# Patient Record
Sex: FEMALE | URBAN - METROPOLITAN AREA
[De-identification: names, ages, dates, MRNs, and addresses within clinical notes are randomized per-mention and may not be internally consistent; named-entity substitution may affect disease eponyms.]

---

## 2019-07-23 ENCOUNTER — IMPORTED ENCOUNTER (OUTPATIENT)
Dept: URBAN - METROPOLITAN AREA CLINIC 31 | Facility: CLINIC | Age: 49
End: 2019-07-23

## 2019-07-23 PROBLEM — H53.469: Noted: 2019-07-23

## 2019-07-23 PROBLEM — M06.9: Noted: 2019-07-23

## 2019-07-23 PROBLEM — M32.9: Noted: 2019-07-23

## 2019-07-23 PROBLEM — Z79.899: Noted: 2019-07-23

## 2019-07-23 PROCEDURE — 92083 EXTENDED VISUAL FIELD XM: CPT

## 2019-07-23 PROCEDURE — 92134 CPTRZ OPH DX IMG PST SGM RTA: CPT

## 2019-07-23 PROCEDURE — 92250 FUNDUS PHOTOGRAPHY W/I&R: CPT

## 2019-07-23 PROCEDURE — 92004 COMPRE OPH EXAM NEW PT 1/>: CPT

## 2019-07-23 NOTE — PATIENT DISCUSSION
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

## 2019-07-23 NOTE — PATIENT DISCUSSION
1.  Plaquenil - Pt currently on oral plaquenil therapy. No signs of retinopathy based on todays dilated exam and OCT. Continue to follow with prescribing medical professional.2. Plaquenil- Pt currently on oral plaquenil therapy. No signs of retinopathy based on todays dilated exam and Visual Field. Continue to follow with prescribing medical professional.3. Lupus - Continue to follow with PCP/Rheumatologist/Neurologist.4. Rheumatoid Arthritis - Continue to follow with PCP/Rheumatologist/Neurologist.5. 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 6. Refractive error- Single vision reading. 7.  Return for an appointment in 6 months for office call. VF 24-2. with Dr. Yuni Eckert.

## 2020-01-13 ENCOUNTER — IMPORTED ENCOUNTER (OUTPATIENT)
Dept: URBAN - METROPOLITAN AREA CLINIC 31 | Facility: CLINIC | Age: 50
End: 2020-01-13

## 2020-01-13 PROBLEM — M06.9: Noted: 2020-01-13

## 2020-01-13 PROBLEM — H53.469: Noted: 2020-01-13

## 2020-01-13 PROBLEM — M32.9: Noted: 2020-01-13

## 2020-01-13 PROBLEM — Z79.899: Noted: 2020-01-13

## 2020-01-13 PROCEDURE — 92083 EXTENDED VISUAL FIELD XM: CPT

## 2020-01-13 PROCEDURE — 99213 OFFICE O/P EST LOW 20 MIN: CPT

## 2020-01-13 NOTE — PATIENT DISCUSSION
1.  Plaquenil - Pt currently on oral plaquenil therapy. No signs of retinopathy today. 2. Lupus - Continue to follow with PCP/Rheumatologist/Neurologist.3. Rheumatoid Arthritis - Continue to follow with PCP/Rheumatologist/Neurologist.4. 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 Stable on today's 24-2 VF. 5. Return for an appointment in 12 month for complete exam and VF 24-2. with Dr. Yuliet Guadalupe.

## 2020-10-27 ENCOUNTER — OFFICE VISIT (OUTPATIENT)
Dept: URBAN - METROPOLITAN AREA CLINIC 121 | Facility: CLINIC | Age: 50
End: 2020-10-27

## 2021-01-18 ENCOUNTER — IMPORTED ENCOUNTER (OUTPATIENT)
Dept: URBAN - METROPOLITAN AREA CLINIC 31 | Facility: CLINIC | Age: 51
End: 2021-01-18

## 2021-01-18 PROBLEM — M32.9: Noted: 2021-01-18

## 2021-01-18 PROBLEM — Z79.899: Noted: 2021-01-18

## 2021-01-18 PROBLEM — H53.469: Noted: 2021-01-18

## 2021-01-18 PROBLEM — M06.9: Noted: 2021-01-18

## 2021-01-18 PROCEDURE — 92015 DETERMINE REFRACTIVE STATE: CPT

## 2021-01-18 PROCEDURE — 92083 EXTENDED VISUAL FIELD XM: CPT

## 2021-01-18 PROCEDURE — 92134 CPTRZ OPH DX IMG PST SGM RTA: CPT

## 2021-01-18 PROCEDURE — 92014 COMPRE OPH EXAM EST PT 1/>: CPT

## 2021-01-18 PROCEDURE — 92250 FUNDUS PHOTOGRAPHY W/I&R: CPT

## 2021-01-18 NOTE — PATIENT DISCUSSION
1.  Plaquenil - Pt currently on oral plaquenil therapy. No signs of retinopathy today. 2. Lupus - Continue to follow with PCP/Rheumatologist/Neurologist.3. Rheumatoid Arthritis - Continue to follow with PCP/Rheumatologist/Neurologist.4. 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 Stable on today's 24-2 VF. 5. Return for an appointment in 12 month for complete exam macula OCT and VF 24-2. with Dr. Paris Roberts.

## 2021-06-11 ENCOUNTER — IMPORTED ENCOUNTER (OUTPATIENT)
Dept: URBAN - METROPOLITAN AREA CLINIC 31 | Facility: CLINIC | Age: 51
End: 2021-06-11

## 2021-06-11 PROCEDURE — 92250 FUNDUS PHOTOGRAPHY W/I&R: CPT

## 2021-06-11 PROCEDURE — 99214 OFFICE O/P EST MOD 30 MIN: CPT

## 2021-06-18 ENCOUNTER — OFFICE VISIT (OUTPATIENT)
Dept: URBAN - METROPOLITAN AREA CLINIC 63 | Facility: CLINIC | Age: 51
End: 2021-06-18

## 2021-07-22 ENCOUNTER — OFFICE VISIT (OUTPATIENT)
Dept: URBAN - METROPOLITAN AREA SURGERY CENTER 4 | Facility: SURGERY CENTER | Age: 51
End: 2021-07-22

## 2021-07-27 LAB — PATHOLOGY (INDENTED REPORT): (no result)

## 2021-08-05 ENCOUNTER — OFFICE VISIT (OUTPATIENT)
Dept: URBAN - METROPOLITAN AREA CLINIC 63 | Facility: CLINIC | Age: 51
End: 2021-08-05

## 2021-08-09 ENCOUNTER — OFFICE VISIT (OUTPATIENT)
Dept: URBAN - METROPOLITAN AREA CLINIC 63 | Facility: CLINIC | Age: 51
End: 2021-08-09

## 2021-08-10 ENCOUNTER — OFFICE VISIT (OUTPATIENT)
Dept: URBAN - METROPOLITAN AREA TELEHEALTH 2 | Facility: TELEHEALTH | Age: 51
End: 2021-08-10

## 2022-02-28 ENCOUNTER — IMPORTED ENCOUNTER (OUTPATIENT)
Dept: URBAN - METROPOLITAN AREA CLINIC 31 | Facility: CLINIC | Age: 52
End: 2022-02-28

## 2022-02-28 PROBLEM — M32.9: Noted: 2022-02-28

## 2022-02-28 PROBLEM — M06.9: Noted: 2022-02-28

## 2022-02-28 PROBLEM — Z79.899: Noted: 2022-02-28

## 2022-02-28 PROBLEM — H53.469: Noted: 2022-02-28

## 2022-02-28 PROCEDURE — 99214 OFFICE O/P EST MOD 30 MIN: CPT

## 2022-02-28 PROCEDURE — 92134 CPTRZ OPH DX IMG PST SGM RTA: CPT

## 2022-02-28 PROCEDURE — 92083 EXTENDED VISUAL FIELD XM: CPT

## 2022-02-28 PROCEDURE — 92015 DETERMINE REFRACTIVE STATE: CPT

## 2022-02-28 NOTE — PATIENT DISCUSSION
1.  Plaquenil - Pt currently on oral plaquenil therapy. No signs of retinopathy today. 2. Lupus - Continue to follow with PCP/Rheumatologist/Neurologist.3. Rheumatoid Arthritis - Continue to follow with PCP/Rheumatologist/Neurologist.4. 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 Stable on today's 24-2 VF. 5. Refractive error - Glasses change optional.6. Return for an appointment in 12 month for complete exam macula OCT and VF 10-2 with Dr. Krystin Mata.

## 2022-04-02 ASSESSMENT — TONOMETRY
OD_IOP_MMHG: 17
OS_IOP_MMHG: 16
OS_IOP_MMHG: 17
OD_IOP_MMHG: 16
OS_IOP_MMHG: 17
OD_IOP_MMHG: 17

## 2022-04-02 ASSESSMENT — VISUAL ACUITY
OD_SC: 20/25
OS_SC: 20/25
OD_CC: 20/30-2
OD_CC: 20/25-3
OS_CC: 20/20-1
OS_CC: 20/25-2
OD_SC: 20/50
OS_CC: 20/25-1
OS_SC: 20/50
OS_SC: 20/25-2
OD_CC: 20/20
OS_CC: 20/20-2
OD_SC: 20/25
OD_CC: 20/40+2

## 2022-07-09 ENCOUNTER — TELEPHONE ENCOUNTER (OUTPATIENT)
Dept: URBAN - METROPOLITAN AREA CLINIC 121 | Facility: CLINIC | Age: 52
End: 2022-07-09

## 2022-07-09 RX ORDER — AA/PROT/LYSINE/METHIO/VIT C/B6 50-12.5 MG
TABLET ORAL ONCE A DAY
Refills: 0 | OUTPATIENT
Start: 2012-06-05 | End: 2021-06-18

## 2022-07-09 RX ORDER — ESOMEPRAZOLE MAGNESIUM 40 MG
CAPSULE,DELAYED RELEASE (ENTERIC COATED) ORAL TWICE A DAY
Refills: 0 | OUTPATIENT
Start: 2005-12-13 | End: 2006-01-10

## 2022-07-09 RX ORDER — ALBUTEROL SULFATE 0.63 MG/3ML
SOLUTION RESPIRATORY (INHALATION) TAKE AS DIRECTED
Refills: 0 | OUTPATIENT
Start: 2012-06-05 | End: 2021-06-18

## 2022-07-09 RX ORDER — FLUCONAZOLE 100 MG/1
TABLET ORAL TAKE AS DIRECTED
Refills: 0 | OUTPATIENT
Start: 2012-06-05 | End: 2021-06-18

## 2022-07-09 RX ORDER — DICYCLOMINE HYDROCHLORIDE 20 MG/1
TABLET ORAL
Refills: 0 | OUTPATIENT
Start: 2021-05-20 | End: 2021-06-18

## 2022-07-09 RX ORDER — SUCRALFATE 1 G/10ML
AC/HS 10 ML=1 GM SUSPENSION ORAL
Refills: 3 | OUTPATIENT
Start: 2005-12-13 | End: 2006-01-10

## 2022-07-09 RX ORDER — TAMSULOSIN HCL 0.4 MG
CAPSULE ORAL ONCE A DAY
Refills: 0 | OUTPATIENT
Start: 2012-06-05 | End: 2021-06-18

## 2022-07-09 RX ORDER — HYDROXYCHLOROQUINE SULFATE 200 MG/1
TABLET, FILM COATED ORAL
Refills: 0 | OUTPATIENT
Start: 2021-05-25 | End: 2021-06-18

## 2022-07-09 RX ORDER — FLUTICASONE PROPIONATE 50 UG/1
SPRAY, METERED NASAL
Refills: 0 | OUTPATIENT
Start: 2011-03-09 | End: 2012-06-05

## 2022-07-09 RX ORDER — NADOLOL 20 MG/1
TABLET ORAL
Refills: 0 | OUTPATIENT
Start: 2011-03-09 | End: 2013-06-05

## 2022-07-09 RX ORDER — HYDROXYCHLOROQUINE SULFATE 200 MG/1
TABLET, FILM COATED ORAL
Refills: 0 | OUTPATIENT
Start: 2021-06-13 | End: 2021-06-18

## 2022-07-09 RX ORDER — OMEPRAZOLE 40 MG/1
CAPSULE, DELAYED RELEASE ORAL
Refills: 0 | OUTPATIENT
Start: 2021-06-04 | End: 2021-06-18

## 2022-07-09 RX ORDER — ERGOCALCIFEROL 1.25 MG/1
CAPSULE ORAL
Refills: 0 | OUTPATIENT
Start: 2021-05-13 | End: 2021-06-18

## 2022-07-09 RX ORDER — ESOMEPRAZOLE MAGNESIUM 40 MG
CAPSULE,DELAYED RELEASE (ENTERIC COATED) ORAL TWICE A DAY
Refills: 6 | OUTPATIENT
Start: 2006-01-10 | End: 2006-08-15

## 2022-07-09 RX ORDER — MYCOPHENOLATE MOFETIL 250 MG/1
CAPSULE ORAL
Refills: 0 | OUTPATIENT
Start: 2021-06-10 | End: 2021-06-18

## 2022-07-09 RX ORDER — TAMSULOSIN HYDROCHLORIDE 0.4 MG/1
CAPSULE ORAL
Refills: 0 | OUTPATIENT
Start: 2011-03-09 | End: 2013-06-05

## 2022-07-09 RX ORDER — ALPRAZOLAM 0.25 MG/1
TABLET ORAL
Refills: 0 | OUTPATIENT
Start: 2021-06-07 | End: 2021-06-18

## 2022-07-09 RX ORDER — MONTELUKAST SODIUM 5 MG/1
TABLET, CHEWABLE ORAL
Refills: 0 | OUTPATIENT
Start: 2011-03-09 | End: 2012-06-05

## 2022-07-09 RX ORDER — AMOXICILLIN 500 MG/1
CAPSULE ORAL TAKE AS DIRECTED
Refills: 0 | OUTPATIENT
Start: 2012-06-05 | End: 2021-06-18

## 2022-07-09 RX ORDER — GABAPENTIN 300 MG/1
CAPSULE ORAL
Refills: 0 | OUTPATIENT
Start: 2021-03-22 | End: 2021-06-18

## 2022-07-09 RX ORDER — CHROMIUM 200 MCG
TABLET ORAL
Refills: 0 | OUTPATIENT
Start: 2011-03-09 | End: 2012-06-05

## 2022-07-09 RX ORDER — ALPRAZOLAM 0.25 MG/1
TABLET ORAL
Refills: 0 | OUTPATIENT
Start: 2011-03-09 | End: 2021-06-18

## 2022-07-09 RX ORDER — CALCIUM NO.38/D3/MAG/BORON ASP 500MG/15ML
LIQUID (ML) ORAL
Refills: 0 | OUTPATIENT
Start: 2011-03-09 | End: 2012-06-05

## 2022-07-09 RX ORDER — AZATHIOPRINE 50 MG/1
TABLET ORAL
Refills: 0 | OUTPATIENT
Start: 2011-03-09 | End: 2021-06-18

## 2022-07-09 RX ORDER — AMOXICILLIN 500 MG/1
TABLET, FILM COATED ORAL
Refills: 0 | OUTPATIENT
Start: 2011-03-09 | End: 2012-06-05

## 2022-07-09 RX ORDER — PAROXETINE HYDROCHLORIDE 10 MG/1
TABLET ORAL
Refills: 0 | OUTPATIENT
Start: 2021-05-09 | End: 2021-06-18

## 2022-07-09 RX ORDER — ROPINIROLE 0.25 MG/1
TABLET, FILM COATED ORAL
Refills: 0 | OUTPATIENT
Start: 2011-03-09 | End: 2021-06-18

## 2022-07-09 RX ORDER — BUSPIRONE HYDROCHLORIDE 15 MG/1
TABLET ORAL
Refills: 0 | OUTPATIENT
Start: 2011-03-09 | End: 2021-06-18

## 2022-07-09 RX ORDER — DEXLANSOPRAZOLE 60 MG/1
CAPSULE, DELAYED RELEASE ORAL ONCE A DAY
Refills: 0 | OUTPATIENT
Start: 2012-06-05 | End: 2013-09-04

## 2022-07-09 RX ORDER — PREDNISONE 5 MG/1
TABLET ORAL THREE TIMES A DAY
Refills: 0 | OUTPATIENT
Start: 2012-06-05 | End: 2021-06-18

## 2022-07-09 RX ORDER — MEDROXYPROGESTERONE ACETATE 150 MG/ML
INJECTION, SUSPENSION INTRAMUSCULAR
Refills: 0 | OUTPATIENT
Start: 2011-03-09 | End: 2012-06-05

## 2022-07-09 RX ORDER — LACOSAMIDE 100 MG/1
TABLET, FILM COATED ORAL
Refills: 0 | OUTPATIENT
Start: 2011-03-09 | End: 2012-06-05

## 2022-07-10 ENCOUNTER — TELEPHONE ENCOUNTER (OUTPATIENT)
Dept: URBAN - METROPOLITAN AREA CLINIC 121 | Facility: CLINIC | Age: 52
End: 2022-07-10

## 2022-07-10 RX ORDER — HYDROXYCHLOROQUINE SULFATE 200 MG/1
TABLET, FILM COATED ORAL ONCE A DAY
Refills: 0 | Status: ACTIVE | COMMUNITY
Start: 2021-06-18

## 2022-07-10 RX ORDER — DICYCLOMINE HYDROCHLORIDE 20 MG/1
TABLET ORAL ONCE A DAY
Refills: 0 | Status: ACTIVE | COMMUNITY
Start: 2021-06-18

## 2022-07-10 RX ORDER — MESALAMINE 500 MG/1
CAPSULE ORAL
Refills: 11 | Status: ACTIVE | COMMUNITY
Start: 2012-09-14

## 2022-07-10 RX ORDER — LEVETIRACETAM 500 MG/1
TABLET, FILM COATED ORAL
Refills: 0 | Status: ACTIVE | COMMUNITY
Start: 2021-06-18

## 2022-07-10 RX ORDER — OMEPRAZOLE 20 MG/1
CAPSULE, DELAYED RELEASE ORAL TWICE A DAY
Refills: 3 | Status: ACTIVE | COMMUNITY
Start: 2011-03-09

## 2022-07-10 RX ORDER — SUCRALFATE 1 G/10ML
AC/HS 10 ML=1 GM SUSPENSION ORAL
Refills: 6 | Status: ACTIVE | COMMUNITY
Start: 2006-01-10

## 2022-07-10 RX ORDER — ERGOCALCIFEROL 1.25 MG/1
CAPSULE ORAL ONCE A DAY
Refills: 0 | Status: ACTIVE | COMMUNITY
Start: 2021-06-18

## 2022-07-10 RX ORDER — MYCOPHENOLATE MOFETIL 500 MG/1
TABLET, FILM COATED ORAL
Refills: 0 | Status: ACTIVE | COMMUNITY
Start: 2021-06-18

## 2022-07-10 RX ORDER — GABAPENTIN 300 MG/1
CAPSULE ORAL THREE TIMES A DAY
Refills: 0 | Status: ACTIVE | COMMUNITY
Start: 2021-06-18

## 2022-07-10 RX ORDER — PAROXETINE HYDROCHLORIDE 10 MG/1
1/2 TAB TABLET ORAL
Refills: 0 | Status: ACTIVE | COMMUNITY
Start: 2021-06-18

## 2022-07-10 RX ORDER — DEXLANSOPRAZOLE 60 MG/1
CAPSULE, DELAYED RELEASE ORAL TWICE A DAY
Refills: 3 | Status: ACTIVE | COMMUNITY
Start: 2013-01-18

## 2022-07-10 RX ORDER — ALPRAZOLAM 0.25 MG/1
TABLET ORAL
Refills: 0 | Status: ACTIVE | COMMUNITY
Start: 2021-06-18

## 2022-07-10 RX ORDER — OMEPRAZOLE 40 MG/1
CAPSULE, DELAYED RELEASE ORAL ONCE A DAY
Refills: 0 | Status: ACTIVE | COMMUNITY
Start: 2021-06-18

## 2022-07-10 RX ORDER — ESOMEPRAZOLE MAGNESIUM 40 MG
CAPSULE,DELAYED RELEASE (ENTERIC COATED) ORAL TWICE A DAY
Refills: 3 | Status: ACTIVE | COMMUNITY
Start: 2006-08-15

## 2023-06-28 ENCOUNTER — COMPREHENSIVE EXAM (OUTPATIENT)
Dept: URBAN - METROPOLITAN AREA CLINIC 29 | Facility: CLINIC | Age: 53
End: 2023-06-28

## 2023-06-28 DIAGNOSIS — H52.203: ICD-10-CM

## 2023-06-28 DIAGNOSIS — H52.13: ICD-10-CM

## 2023-06-28 DIAGNOSIS — H43.813: ICD-10-CM

## 2023-06-28 DIAGNOSIS — Z79.899: ICD-10-CM

## 2023-06-28 DIAGNOSIS — H52.4: ICD-10-CM

## 2023-06-28 DIAGNOSIS — H53.469: ICD-10-CM

## 2023-06-28 PROCEDURE — 92014 COMPRE OPH EXAM EST PT 1/>: CPT

## 2023-06-28 PROCEDURE — 92134 CPTRZ OPH DX IMG PST SGM RTA: CPT

## 2023-06-28 PROCEDURE — 92083 EXTENDED VISUAL FIELD XM: CPT

## 2023-06-28 PROCEDURE — 92015 DETERMINE REFRACTIVE STATE: CPT

## 2023-06-28 ASSESSMENT — VISUAL ACUITY
OD_SC: 20/20
OS_SC: 20/20
OS_CC: 20/20-1
OD_CC: 20/20

## 2024-01-24 ENCOUNTER — TELEPHONE ENCOUNTER (OUTPATIENT)
Dept: URBAN - METROPOLITAN AREA CLINIC 63 | Facility: CLINIC | Age: 54
End: 2024-01-24

## 2024-03-06 ENCOUNTER — OV EP (OUTPATIENT)
Dept: URBAN - METROPOLITAN AREA CLINIC 63 | Facility: CLINIC | Age: 54
End: 2024-03-06
Payer: COMMERCIAL

## 2024-03-06 ENCOUNTER — LAB (OUTPATIENT)
Dept: URBAN - METROPOLITAN AREA CLINIC 63 | Facility: CLINIC | Age: 54
End: 2024-03-06

## 2024-03-06 VITALS
BODY MASS INDEX: 18.96 KG/M2 | TEMPERATURE: 97.7 F | DIASTOLIC BLOOD PRESSURE: 70 MMHG | HEIGHT: 63 IN | WEIGHT: 107 LBS | OXYGEN SATURATION: 97 % | HEART RATE: 70 BPM | SYSTOLIC BLOOD PRESSURE: 100 MMHG

## 2024-03-06 DIAGNOSIS — K29.00 ACUTE GASTRITIS WITHOUT HEMORRHAGE, UNSPECIFIED GASTRITIS TYPE: ICD-10-CM

## 2024-03-06 PROCEDURE — 99214 OFFICE O/P EST MOD 30 MIN: CPT | Performed by: NURSE PRACTITIONER

## 2024-03-06 RX ORDER — BENZONATATE 200 MG/1
TAKE 1 CAPSULE BY MOUTH THREE TIMES A DAY FOR 30 DAYS CAPSULE ORAL
Qty: 90 EACH | Refills: 0 | Status: ACTIVE | COMMUNITY

## 2024-03-06 RX ORDER — HYDROXYCHLOROQUINE SULFATE 200 MG/1
TABLET, FILM COATED ORAL
Qty: 90 TABLET | Status: ACTIVE | COMMUNITY

## 2024-03-06 RX ORDER — ESOMEPRAZOLE MAGNESIUM 40 MG/1
1 CAPSULE CAPSULE, DELAYED RELEASE ORAL TWICE A DAY
Qty: 180 CAPSULE | Refills: 3 | OUTPATIENT
Start: 2024-03-06

## 2024-03-06 RX ORDER — SUCRALFATE 1 G/1
TAKE 1 TABLET BY MOUTH TWICE A DAY ON EMPTY STOMACH TABLET ORAL
Qty: 180 EACH | Refills: 0 | Status: ACTIVE | COMMUNITY

## 2024-03-06 RX ORDER — GABAPENTIN 300 MG/1
3 CAPSULES ORALLY ONCE A DAY, AT BEDTIME 90 DAYS CAPSULE ORAL
Qty: 270 EACH | Refills: 0 | Status: ACTIVE | COMMUNITY

## 2024-03-06 RX ORDER — PAROXETINE 10 MG/1
TABLET, FILM COATED ORAL
Qty: 45 TABLET | Status: ACTIVE | COMMUNITY

## 2024-03-06 RX ORDER — SUCRALFATE 1 G/1
TABLET ORAL
Qty: 180 TABLET | Status: ACTIVE | COMMUNITY

## 2024-03-06 RX ORDER — MYCOPHENOLATE MOFETIL 250 MG/1
CAPSULE ORAL
Qty: 180 CAPSULE | Status: ACTIVE | COMMUNITY

## 2024-03-06 RX ORDER — OMEPRAZOLE 40 MG/1
1 CAPSULE CAPSULE, DELAYED RELEASE ORAL TWICE A DAY
Qty: 180 CAPSULE | Status: ACTIVE | COMMUNITY

## 2024-03-06 RX ORDER — DICYCLOMINE HYDROCHLORIDE 20 MG/1
TAKE 1 TABLET BY MOUTH TWICE A DAY AS DIRECTED TABLET ORAL
Qty: 180 EACH | Refills: 0 | Status: ACTIVE | COMMUNITY

## 2024-03-06 RX ORDER — LEVETIRACETAM 500 MG/1
TABLET, FILM COATED ORAL
Qty: 135 TABLET | Status: ACTIVE | COMMUNITY

## 2024-03-06 NOTE — HPI-PREVIOUS LABS
Lab work dated 21 January 2024 demonstrates the following abnormalities: WBC 2.5, absolute neutrophils 1.3, absolute lymphocytes 0.9, CO2 31, anion gap 3, AST 37. All remaining lab values of CBC, CMP, serum hCG, troponin and urinalysis are negative or within normal limits.

## 2024-03-06 NOTE — HPI-PREVIOUS IMAGING
CT abdomen and pelvis with contrast/21 January 2024. Gastric wall edema appears similar to prior study: recurrent gastritis is likely. ********** RUQ ultrasound/21 January 2024. Unremarkable right upper quadrant ultrasound.

## 2024-03-06 NOTE — HPI-TODAY'S VISIT:
Thank you very much for kindly referring Leny Cardona, a very pleasant 53-year-old female, back to our service and ER follow-up secondary to gastritis.  Past medical history significant for hyperlipidemia, SLE, epilepsy, asthma, IBS, anxiety, hiatal hernia, RLS, pyelonephritis, Crohn's disease and colon polyps.  Past surgical history significant for Mohs x 3 and craniotomy.  She reports a sorrel history of colon cancer and her last complete colonoscopy was 22 July 2021 that was significant for multiple, advanced adenomatous polyps, but no evidence of active Crohn's disease.  Her Crohn's is currently untreated and has been for the past 3 years.  Leny presents today complaining of epigastric discomfort that has significantly improved since ER visit dated 21 January 2024 (see ED provider note below).  She has been using omeprazole, 40 mg, twice daily and was added sucralfate which has provided benefit.  She reports no change to her bowel habits and also was started on a FODMAP diet, with reportedly good benefit.  She denies pyrosis, dysphagia, change in bowel habits, rectal bleeding, melena or unintentional weight loss.  **********  As per [facility] ED provider note dated 21 January 2024 (Wilman Marie MD): "The patient is a 53 y.o. female who presents to the emergency department via Walk-in with a chief complaint of Abdominal Pain.  53-year-old female with past medical history significant for lupus, hyperlipidemia, possible Crohn's disease presenting with a chief complaint of abdominal pain.  Ongoing for the last Monday almost a week.  Mainly epigastric.  Sometimes radiates into her back.  She describes it as a burning.  No obvious provoking relieving factors.  Has been nauseous no vomiting.  No chest pain.  No black or bloody stools.  No urinary symptoms.  No fevers.  Denies previous abdominal surgeries. Management/Reassessment/MDM: 53-year-old female presenting with abdominal pain.  Doubt atypical cardiac, labs largely noncontributory slight leukopenia.  CT abdomen pelvis showing suggestion of gastritis which I think would be in keeping her presentation.  She already takes omeprazole 40 twice daily.  She has GI.  Advise contact her GI have also provided an internal referral.  I will add Carafate.  Looks well.  Given return precautions.  Advised avoid irritants.Impression/Diagnosis(es): The primary encounter diagnosis was Gastritis, presence of bleeding unspecified, unspecified chronicity, unspecified gastritis type. A diagnosis of Abdominal pain, unspecified abdominal location was also pertinent to this visit."

## 2024-03-06 NOTE — PHYSICAL EXAM CONSTITUTIONAL:
well developed, well nourished , in no acute distress , ambulating without difficulty , normal communication ability Home

## 2024-05-29 ENCOUNTER — WEB ENCOUNTER (OUTPATIENT)
Dept: URBAN - METROPOLITAN AREA CLINIC 63 | Facility: CLINIC | Age: 54
End: 2024-05-29

## 2024-06-04 ENCOUNTER — WEB ENCOUNTER (OUTPATIENT)
Dept: URBAN - METROPOLITAN AREA CLINIC 63 | Facility: CLINIC | Age: 54
End: 2024-06-04

## 2024-06-18 ENCOUNTER — TELEPHONE ENCOUNTER (OUTPATIENT)
Dept: URBAN - METROPOLITAN AREA CLINIC 63 | Facility: CLINIC | Age: 54
End: 2024-06-18

## 2024-06-19 ENCOUNTER — TELEPHONE ENCOUNTER (OUTPATIENT)
Dept: URBAN - METROPOLITAN AREA CLINIC 63 | Facility: CLINIC | Age: 54
End: 2024-06-19

## 2024-06-19 PROBLEM — 428283002: Status: ACTIVE | Noted: 2024-06-19

## 2024-06-26 ENCOUNTER — LAB OUTSIDE AN ENCOUNTER (OUTPATIENT)
Dept: URBAN - METROPOLITAN AREA CLINIC 63 | Facility: CLINIC | Age: 54
End: 2024-06-26

## 2024-06-26 ENCOUNTER — WEB ENCOUNTER (OUTPATIENT)
Dept: URBAN - METROPOLITAN AREA CLINIC 63 | Facility: CLINIC | Age: 54
End: 2024-06-26

## 2024-07-01 ENCOUNTER — ESTABLISHED PATIENT (OUTPATIENT)
Dept: URBAN - METROPOLITAN AREA CLINIC 29 | Facility: CLINIC | Age: 54
End: 2024-07-01

## 2024-07-01 DIAGNOSIS — M06.9: ICD-10-CM

## 2024-07-01 DIAGNOSIS — H43.813: ICD-10-CM

## 2024-07-01 DIAGNOSIS — Z79.899: ICD-10-CM

## 2024-07-01 DIAGNOSIS — H53.469: ICD-10-CM

## 2024-07-01 DIAGNOSIS — M32.9: ICD-10-CM

## 2024-07-01 PROCEDURE — 92083 EXTENDED VISUAL FIELD XM: CPT

## 2024-07-01 PROCEDURE — 99214 OFFICE O/P EST MOD 30 MIN: CPT

## 2024-07-01 PROCEDURE — 92134 CPTRZ OPH DX IMG PST SGM RTA: CPT

## 2024-07-01 PROCEDURE — 92133 CPTRZD OPH DX IMG PST SGM ON: CPT

## 2024-07-01 ASSESSMENT — VISUAL ACUITY
OS_CC: 20/20
OS_CC: 20/20-2
OD_CC: 20/20
OD_SC: 20/20-2
OS_SC: 20/20-1
OD_CC: 20/20

## 2024-07-01 ASSESSMENT — TONOMETRY
OD_IOP_MMHG: 15
OS_IOP_MMHG: 15

## 2024-07-06 ENCOUNTER — WEB ENCOUNTER (OUTPATIENT)
Dept: URBAN - METROPOLITAN AREA CLINIC 63 | Facility: CLINIC | Age: 54
End: 2024-07-06

## 2024-07-23 ENCOUNTER — WEB ENCOUNTER (OUTPATIENT)
Dept: URBAN - METROPOLITAN AREA CLINIC 63 | Facility: CLINIC | Age: 54
End: 2024-07-23

## 2024-07-25 ENCOUNTER — CLAIMS CREATED FROM THE CLAIM WINDOW (OUTPATIENT)
Dept: URBAN - METROPOLITAN AREA CLINIC 4 | Facility: CLINIC | Age: 54
End: 2024-07-25
Payer: COMMERCIAL

## 2024-07-25 ENCOUNTER — OFFICE VISIT (OUTPATIENT)
Dept: URBAN - METROPOLITAN AREA SURGERY CENTER 4 | Facility: SURGERY CENTER | Age: 54
End: 2024-07-25
Payer: COMMERCIAL

## 2024-07-25 DIAGNOSIS — K52.3 INDETERMINATE COLITIS: ICD-10-CM

## 2024-07-25 DIAGNOSIS — K31.89 OTHER DISEASES OF STOMACH AND DUODENUM: ICD-10-CM

## 2024-07-25 DIAGNOSIS — K21.9 GASTRO-ESOPHAGEAL REFLUX DISEASE WITHOUT ESOPHAGITIS: ICD-10-CM

## 2024-07-25 DIAGNOSIS — K44.9 DIAPHRAGMATIC HERNIA WITHOUT OBSTRUCTION OR GANGRENE: ICD-10-CM

## 2024-07-25 DIAGNOSIS — K29.70 GASTRITIS WITHOUT BLEEDING, UNSPECIFIED CHRONICITY, UNSPECIFIED GASTRITIS TYPE: ICD-10-CM

## 2024-07-25 DIAGNOSIS — K31.7 GASTRIC POLYP: ICD-10-CM

## 2024-07-25 DIAGNOSIS — K64.1 SECOND DEGREE HEMORRHOIDS: ICD-10-CM

## 2024-07-25 DIAGNOSIS — K31.7 POLYP OF STOMACH AND DUODENUM: ICD-10-CM

## 2024-07-25 DIAGNOSIS — K29.70 GASTRITIS, UNSPECIFIED, WITHOUT BLEEDING: ICD-10-CM

## 2024-07-25 DIAGNOSIS — K31.7 GASTRIC POLYPS: ICD-10-CM

## 2024-07-25 DIAGNOSIS — K63.89 BOWEL DISEASE, INFLAMMATORY: ICD-10-CM

## 2024-07-25 DIAGNOSIS — K29.70 GASTRITIS: ICD-10-CM

## 2024-07-25 PROCEDURE — 00813 ANES UPR LWR GI NDSC PX: CPT | Performed by: NURSE ANESTHETIST, CERTIFIED REGISTERED

## 2024-07-25 PROCEDURE — 45380 COLONOSCOPY AND BIOPSY: CPT | Performed by: INTERNAL MEDICINE

## 2024-07-25 PROCEDURE — 43239 EGD BIOPSY SINGLE/MULTIPLE: CPT | Performed by: INTERNAL MEDICINE

## 2024-07-25 PROCEDURE — 88312 SPECIAL STAINS GROUP 1: CPT | Performed by: PATHOLOGY

## 2024-07-25 PROCEDURE — 88305 TISSUE EXAM BY PATHOLOGIST: CPT | Performed by: PATHOLOGY

## 2024-07-25 RX ORDER — GABAPENTIN 300 MG/1
3 CAPSULES ORALLY ONCE A DAY, AT BEDTIME 90 DAYS CAPSULE ORAL
Qty: 270 EACH | Refills: 0 | Status: ACTIVE | COMMUNITY

## 2024-07-25 RX ORDER — HYDROXYCHLOROQUINE SULFATE 200 MG/1
TABLET, FILM COATED ORAL
Qty: 90 TABLET | Status: ACTIVE | COMMUNITY

## 2024-07-25 RX ORDER — LEVETIRACETAM 500 MG/1
TABLET, FILM COATED ORAL
Qty: 135 TABLET | Status: ACTIVE | COMMUNITY

## 2024-07-25 RX ORDER — MYCOPHENOLATE MOFETIL 250 MG/1
CAPSULE ORAL
Qty: 180 CAPSULE | Status: ACTIVE | COMMUNITY

## 2024-07-25 RX ORDER — OMEPRAZOLE 40 MG/1
1 CAPSULE CAPSULE, DELAYED RELEASE ORAL TWICE A DAY
Qty: 180 CAPSULE | Status: ACTIVE | COMMUNITY

## 2024-07-25 RX ORDER — BENZONATATE 200 MG/1
TAKE 1 CAPSULE BY MOUTH THREE TIMES A DAY FOR 30 DAYS CAPSULE ORAL
Qty: 90 EACH | Refills: 0 | Status: ACTIVE | COMMUNITY

## 2024-07-25 RX ORDER — SUCRALFATE 1 G/1
TABLET ORAL
Qty: 180 TABLET | Status: ACTIVE | COMMUNITY

## 2024-07-25 RX ORDER — SUCRALFATE 1 G/1
TAKE 1 TABLET BY MOUTH TWICE A DAY ON EMPTY STOMACH TABLET ORAL
Qty: 180 EACH | Refills: 0 | Status: ACTIVE | COMMUNITY

## 2024-07-25 RX ORDER — PAROXETINE 10 MG/1
TABLET, FILM COATED ORAL
Qty: 45 TABLET | Status: ACTIVE | COMMUNITY

## 2024-07-25 RX ORDER — ESOMEPRAZOLE MAGNESIUM 40 MG/1
1 CAPSULE CAPSULE, DELAYED RELEASE ORAL TWICE A DAY
Qty: 180 CAPSULE | Refills: 3 | Status: ACTIVE | COMMUNITY
Start: 2024-03-06

## 2024-07-25 RX ORDER — DICYCLOMINE HYDROCHLORIDE 20 MG/1
TAKE 1 TABLET BY MOUTH TWICE A DAY AS DIRECTED TABLET ORAL
Qty: 180 EACH | Refills: 0 | Status: ACTIVE | COMMUNITY

## 2024-07-30 ENCOUNTER — TELEPHONE ENCOUNTER (OUTPATIENT)
Dept: URBAN - METROPOLITAN AREA CLINIC 64 | Facility: CLINIC | Age: 54
End: 2024-07-30

## 2024-08-01 ENCOUNTER — OFFICE VISIT (OUTPATIENT)
Dept: URBAN - METROPOLITAN AREA CLINIC 63 | Facility: CLINIC | Age: 54
End: 2024-08-01
Payer: COMMERCIAL

## 2024-08-01 ENCOUNTER — OFFICE VISIT (OUTPATIENT)
Dept: URBAN - METROPOLITAN AREA CLINIC 63 | Facility: CLINIC | Age: 54
End: 2024-08-01

## 2024-08-01 ENCOUNTER — DASHBOARD ENCOUNTERS (OUTPATIENT)
Age: 54
End: 2024-08-01

## 2024-08-01 VITALS
HEART RATE: 72 BPM | OXYGEN SATURATION: 98 % | SYSTOLIC BLOOD PRESSURE: 108 MMHG | TEMPERATURE: 98.2 F | BODY MASS INDEX: 20.38 KG/M2 | WEIGHT: 115 LBS | HEIGHT: 63 IN | DIASTOLIC BLOOD PRESSURE: 74 MMHG

## 2024-08-01 DIAGNOSIS — K21.9 GASTROESOPHAGEAL REFLUX DISEASE WITHOUT ESOPHAGITIS: ICD-10-CM

## 2024-08-01 DIAGNOSIS — K50.80 CROHN'S DISEASE OF BOTH SMALL AND LARGE INTESTINE WITHOUT COMPLICATION: ICD-10-CM

## 2024-08-01 PROBLEM — 71833008: Status: ACTIVE | Noted: 2024-08-01

## 2024-08-01 PROBLEM — 266435005: Status: ACTIVE | Noted: 2024-08-01

## 2024-08-01 PROCEDURE — 99214 OFFICE O/P EST MOD 30 MIN: CPT

## 2024-08-01 RX ORDER — DICYCLOMINE HYDROCHLORIDE 20 MG/1
TAKE 1 TABLET BY MOUTH TWICE A DAY AS DIRECTED TABLET ORAL
Qty: 180 EACH | Refills: 0 | Status: ACTIVE | COMMUNITY

## 2024-08-01 RX ORDER — OMEPRAZOLE 40 MG/1
1 CAPSULE CAPSULE, DELAYED RELEASE ORAL TWICE A DAY
Qty: 180 CAPSULE | Status: ACTIVE | COMMUNITY

## 2024-08-01 RX ORDER — PAROXETINE 10 MG/1
TABLET, FILM COATED ORAL
Qty: 45 TABLET | Status: ACTIVE | COMMUNITY

## 2024-08-01 RX ORDER — ESOMEPRAZOLE MAGNESIUM 40 MG/1
1 CAPSULE CAPSULE, DELAYED RELEASE ORAL ONCE A DAY
Qty: 90 CAPSULE | Refills: 3 | OUTPATIENT
Start: 2024-08-01

## 2024-08-01 RX ORDER — FLUTICASONE PROPIONATE 50 UG/1
1 SPRAY IN EACH NOSTRIL SPRAY, METERED NASAL ONCE A DAY
Status: ACTIVE | COMMUNITY

## 2024-08-01 RX ORDER — GABAPENTIN 300 MG/1
3 CAPSULES ORALLY ONCE A DAY, AT BEDTIME 90 DAYS CAPSULE ORAL
Qty: 270 EACH | Refills: 0 | Status: ACTIVE | COMMUNITY

## 2024-08-01 RX ORDER — ESOMEPRAZOLE MAGNESIUM 40 MG/1
1 CAPSULE CAPSULE, DELAYED RELEASE ORAL TWICE A DAY
Qty: 180 CAPSULE | Refills: 3 | Status: ACTIVE | COMMUNITY
Start: 2024-03-06

## 2024-08-01 RX ORDER — LEVETIRACETAM 500 MG/1
TABLET, FILM COATED ORAL
Qty: 135 TABLET | Status: ACTIVE | COMMUNITY

## 2024-08-01 RX ORDER — BENZONATATE 200 MG/1
TAKE 1 CAPSULE BY MOUTH THREE TIMES A DAY FOR 30 DAYS CAPSULE ORAL
Qty: 90 EACH | Refills: 0 | Status: ACTIVE | COMMUNITY

## 2024-08-01 RX ORDER — SUCRALFATE 1 G/1
1 TABLET ON AN EMPTY STOMACH TABLET ORAL TWICE A DAY
Qty: 180 TABLET | Refills: 3

## 2024-08-01 RX ORDER — HYDROXYCHLOROQUINE SULFATE 200 MG/1
TABLET, FILM COATED ORAL
Qty: 90 TABLET | Status: ACTIVE | COMMUNITY

## 2024-08-01 RX ORDER — ALBUTEROL SULFATE 90 UG/1
1 PUFF AS NEEDED AEROSOL, METERED RESPIRATORY (INHALATION)
Status: ACTIVE | COMMUNITY

## 2024-08-01 RX ORDER — MYCOPHENOLATE MOFETIL 250 MG/1
CAPSULE ORAL
Qty: 180 CAPSULE | Status: ACTIVE | COMMUNITY

## 2024-08-01 RX ORDER — SUCRALFATE 1 G/1
TAKE 1 TABLET BY MOUTH TWICE A DAY ON EMPTY STOMACH TABLET ORAL
Qty: 180 EACH | Refills: 0 | Status: ACTIVE | COMMUNITY

## 2024-08-01 RX ORDER — ALPRAZOLAM 0.25 MG/1
1 TABLET TABLET ORAL TWICE A DAY
Status: ACTIVE | COMMUNITY

## 2024-08-01 RX ORDER — SUCRALFATE 1 G/1
TABLET ORAL
Qty: 180 TABLET | Status: ACTIVE | COMMUNITY

## 2024-08-01 NOTE — HPI-PREVIOUS IMAGING
Upper GI series, 4/26/2024 - Thickening of the gastric rugae which can correlate with gastritis - Normal appearance of the esophagus.  No reflux was appreciated. . CT abdomen and pelvis with contrast, 1/21/2024.  -Gastric wall edema appears similar to prior study: recurrent gastritis is likely. . RUQ ultrasound, 1/21/202 -Unremarkable right upper quadrant ultrasound.

## 2024-08-01 NOTE — HPI-TODAY'S VISIT:
This is a very pleasant 53-year-old female who presetns to day with the chief complaint of post o/p follow up.  Past medical history significant for hyperlipidemia, SLE, epilepsy, asthma, IBS, anxiety, hiatal hernia, RLS, pyelonephritis, Crohn's disease and colon polyps.  Past surgical history significant for Mohs x 3 and craniotomy. Family history is significant for sororal history of colon cancer, diagnosed at 55, paternal skin cancer, and paternal aunt with breast cancer. Last colonoscopy, 7/25/2024, 2-year recall Last endoscopy, 7/25/2024 Patient does not follow with a cardiologist.  Patient was last seen in the office on 3/6/2024 with JR Back for gastritis. At that visit, she explained she has been using omeprazole, 40 mg, twice daily and was added sucralfate which has provided benefit.  She reported no change to her bowel habits and also was started on a FODMAP diet, with reportedly good benefit. Her Crohn's is currently untreated and has been for the past 3 years. . Reviewed patient's OP and pathology reports with patient. Colonoscopy recall of 2 years. . She is doing well and reports no symptoms at this time.  She endorses a once to twice daily formed, nonstrenuous bowel movement.  Patient denies blood or mucus in the stool, melena, mouth sores, throat pain, and eye, skin, or joint changes.  She explains that currently her rheumatologist has her on mycophenolate for her other autoimmune conditions, which seems to be working well for her Crohn's. . Patient does have a history of GERD for which she takes esomeprazole, 40 mg, once daily, as well as sucralfate twice a day.  Refill sent to pharmacy. . Patient denies abdominal pain, belching, bloating, constipation, diarrhea, dysphagia, melena, hematochezia, hematemesis, nausea, vomiting, BRBPR, and unintentional weight loss.

## 2024-08-01 NOTE — HPI-HOSPITAL FOLLOWUP
As per [facility] ED provider note dated 21 January 2024 (Wilman Marie MD):  "The patient is a 53 y.o. female who presents to the emergency department via Walk-in with a chief complaint of Abdominal Pain. 53-year-old female with past medical history significant for lupus, hyperlipidemia, possible Crohn's disease presenting with a chief complaint of abdominal pain. Ongoing for the last Monday almost a week. Mainly epigastric. Sometimes radiates into her back. She describes it as a burning. No obvious provoking relieving factors. Has been nauseous no vomiting. No chest pain. No black or bloody stools. No urinary symptoms. No fevers. Denies previous abdominal surgeries.  Management/Reassessment/MDM: 53-year-old female presenting with abdominal pain. Doubt atypical cardiac, labs largely noncontributory slight leukopenia. CT abdomen pelvis showing suggestion of gastritis which I think would be in keeping her presentation. She already takes omeprazole 40 twice daily. She has GI. Advise contact her GI have also provided an internal referral. I will add Carafate. Looks well. Given return precautions. Advised avoid irritants.Impression/Diagnosis(es): The primary encounter diagnosis was Gastritis, presence of bleeding unspecified, unspecified chronicity, unspecified gastritis type. A diagnosis of Abdominal pain, unspecified abdominal location was also pertinent to this visit.

## 2024-08-01 NOTE — HPI-CROHN'S DISEASE
Last CRP, fecal calprotectiin, CBC, and CMP done: never done, ordered today Last colonoscoopy (q2y after 8ys of disease): 7/25/2024 Last endoscopy: 7/25/2024 Personal cancer history: no, but 3x Mohs surgerys for pre-cancerous skin lesions Family history of GI cancer: sororal colon rectal cancer, diagnosed at 55; paternal skin cancer; paternal aunt breast cancer Current IBD med: none   Prior IBD meds (failed): pentasa/mesalamine Last HepB and Quantiferon gold done: none.  Steroid use/response: prednisone (but for lupus)  Last CT/MR enterography (q2y): never had, but she is doing very well, will hold off for now IBD surgical history: none Cardiac history: none.  When diagnosed: [place] 2004

## 2024-08-01 NOTE — HPI-PREVIOUS PROCEDURES
Colonoscopy, 7/22/2021 --Unremarkable terminal ileum.  --11 mm advanced tubular adenomatous polyp removed from cecum.  --Two advanced tubular adenomatous polyps (10-14 mm) were removed from the proximal ascending colon.  --Two tubular adenomatous polyps, one advanced in size (8-10 mm) were removed from the mid transverse colon. --Internal hemorrhoids present.  --Biopsies of the ascending colon and rectum demonstrate colonic mucosa without significant histopathologic diagnosis. --All remaining findings are negative or benign.  --Recommend repeat colonoscopy in 3 years due to advanced tubular adenomatous polyps removed on this procedure and a personal history of Crohn's. . EGD and colonoscopy, 4/27/2011 --2 cm hiatal hernia visible in the esophagus.  --Moderately severe gastritis found in the stomach.  --A few fundal foveal polyps (measuring less than 5 mm in size) were found in the fundus.  --Unremarkable duodenum.  --Unremarkable terminal ileum.  --14 mm advanced tubular adenomatous polyp removed from the ascending colon.  --Evidence of mild proctitis. Internal hemorrhoids present.  --Pathology demonstrates ileal type mucosa with lymphoid hyperplasia, negative for significant acute inflammation in the terminal ileum biopsy and large bowel mucosa with focal mild nonspecific chronic inflammation and scattered benign lymphoid aggregates.  --All remaining findings are negative or benign.  --Recommend repeat colonoscopy in 3 years due to advanced tubular adenomatous polyp removed on this procedure.

## 2024-08-01 NOTE — HPI-PREVIOUS LABS
CMP, 7/29/2024 - Urea nitrogen 6 - Rest within normal limits . CBC, CMP, 1/21/2024  -WBC 2.5 -absolute neutrophils 1.3 -absolute lymphocytes 0.9 -CO2 31 -anion gap 3 -AST 37 -All remaining lab values of CBC, CMP, serum hCG, troponin and urinalysis are negative or within normal limits.

## 2024-08-01 NOTE — HPI-PREVIOUS PROCEDURES
EGD, 7/25/2024, Dr. Person - Normal esophagus.  Biopsied. - 2 cm hiatal hernia. - Gastritis.  Biopsied. - Multiple gastric polyps.  Biopsied. - Normal duodenal bulb and second portion of the duodenum.  Biopsied. . Colonoscopy, 7/25/2024, Dr. Person - Internal hemorrhoids. - Biopsies were obtained in the rectum, in the sigmoid colon, in the descending colon, and in the transverse colon and in the ascending colon. - Repeat colonoscopy in 2 years for surveillance based on pathology results. . EGD and colonoscopy pathology report, 7/25/2024 - Duodenal biopsy; no significant abnormality - Stomach, antrum biopsy; mild chemical/reactive gastropathy.  No evidence of H. pylori organisms or intestinal metaplasia.  Negative for dysplasia or malignancy. - Stomach, fundus biopsy; fundic gland polyp.  No evidence of H. pylori organisms or intestinal metaplasia.  Negative for dysplasia or malignancy - Esophagus, lower third biopsy; squamous mucosa with reflux type changes, no columnar mucosa identified.  No evidence of Lemus's esophagus or eosinophilic esophagitis.  Negative for infectious organisms, dysplasia or malignancy. - Ascending colon biopsy; no significant abnormality - Transverse colon biopsy; no significant abnormality - Descending colon biopsy; no significant abnormality - Sigmoid colon biopsy; no significant abnormality - Rectum biopsy; no significant abnormality . Colonoscopy, 7/22/2021.  -Unremarkable terminal ileum.  -11 mm advanced tubular adenomatous polyp removed from cecum.  -Two advanced tubular adenomatous polyps (10-14 mm) were removed from the proximal ascending colon.  -Two tubular adenomatous polyps, one advanced in size (8-10 mm) were removed from the mid transverse colon. -Internal hemorrhoids present.  -Biopsies of the ascending colon and rectum demonstrate colonic mucosa without significant histopathologic diagnosis.  -All remaining findings are negative or benign.  -Recommend repeat colonoscopy in 3 years due to advanced tubular adenomatous polyps removed on this procedure and a personal history of Crohn's. . EGD-colonoscopy, 4/27/2011.  -2 cm hiatal hernia visible in the esophagus.  -Moderately severe gastritis found in the stomach.  -A few fundal foveal polyps (measuring less than 5 mm in size) were found in the fundus.  -Unremarkable duodenum.  -Unremarkable terminal ileum.  -14 mm advanced tubular adenomatous polyp removed from the ascending colon.  -Evidence of mild proctitis.  -Internal hemorrhoids present.  -Pathology demonstrates ileal type mucosa with lymphoid hyperplasia, negative for significant acute inflammation in the terminal ileum biopsy and large bowel mucosa with focal mild nonspecific chronic inflammation and scattered benign lymphoid aggregates.  -All remaining findings are negative or benign.  -Recommend repeat colonoscopy in 3 years due to advanced tubular adenomatous polyp removed on this procedure.

## 2024-11-05 ENCOUNTER — WEB ENCOUNTER (OUTPATIENT)
Dept: URBAN - METROPOLITAN AREA CLINIC 63 | Facility: CLINIC | Age: 54
End: 2024-11-05

## 2025-01-27 ENCOUNTER — FOLLOW UP (OUTPATIENT)
Age: 55
End: 2025-01-27

## 2025-01-27 DIAGNOSIS — M32.9: ICD-10-CM

## 2025-01-27 DIAGNOSIS — Z79.899: ICD-10-CM

## 2025-01-27 DIAGNOSIS — H43.813: ICD-10-CM

## 2025-01-27 DIAGNOSIS — H53.469: ICD-10-CM

## 2025-01-27 DIAGNOSIS — M06.9: ICD-10-CM

## 2025-01-27 PROCEDURE — 99214 OFFICE O/P EST MOD 30 MIN: CPT

## 2025-02-03 ENCOUNTER — OFFICE VISIT (OUTPATIENT)
Dept: URBAN - METROPOLITAN AREA CLINIC 63 | Facility: CLINIC | Age: 55
End: 2025-02-03
Payer: COMMERCIAL

## 2025-02-03 VITALS
WEIGHT: 116.8 LBS | SYSTOLIC BLOOD PRESSURE: 102 MMHG | TEMPERATURE: 98.2 F | HEIGHT: 63 IN | DIASTOLIC BLOOD PRESSURE: 70 MMHG | BODY MASS INDEX: 20.7 KG/M2 | HEART RATE: 78 BPM | OXYGEN SATURATION: 96 %

## 2025-02-03 DIAGNOSIS — R19.5 LOOSE STOOLS: ICD-10-CM

## 2025-02-03 DIAGNOSIS — K21.9 ACID REFLUX: ICD-10-CM

## 2025-02-03 DIAGNOSIS — K50.00 CROHN'S DISEASE OF SMALL INTESTINE WITHOUT COMPLICATION: ICD-10-CM

## 2025-02-03 PROBLEM — 235595009: Status: ACTIVE | Noted: 2025-02-03

## 2025-02-03 PROCEDURE — 99214 OFFICE O/P EST MOD 30 MIN: CPT

## 2025-02-03 RX ORDER — MYCOPHENOLATE MOFETIL 250 MG/1
CAPSULE ORAL
Qty: 180 CAPSULE | Status: ACTIVE | COMMUNITY

## 2025-02-03 RX ORDER — DICYCLOMINE HYDROCHLORIDE 20 MG/1
TAKE 1 TABLET BY MOUTH TWICE A DAY AS DIRECTED TABLET ORAL
Qty: 180 EACH | Refills: 0 | Status: ACTIVE | COMMUNITY

## 2025-02-03 RX ORDER — OMEPRAZOLE 40 MG/1
1 CAPSULE CAPSULE, DELAYED RELEASE ORAL TWICE A DAY
Qty: 180 CAPSULE | Status: ACTIVE | COMMUNITY

## 2025-02-03 RX ORDER — ALPRAZOLAM 0.25 MG/1
1 TABLET TABLET ORAL TWICE A DAY
Status: ACTIVE | COMMUNITY

## 2025-02-03 RX ORDER — ALBUTEROL SULFATE 90 UG/1
1 PUFF AS NEEDED AEROSOL, METERED RESPIRATORY (INHALATION)
Status: ACTIVE | COMMUNITY

## 2025-02-03 RX ORDER — ESOMEPRAZOLE MAGNESIUM 40 MG/1
1 CAPSULE CAPSULE, DELAYED RELEASE ORAL ONCE A DAY
Qty: 90 CAPSULE | Refills: 3 | Status: ACTIVE | COMMUNITY
Start: 2024-08-01

## 2025-02-03 RX ORDER — ESOMEPRAZOLE MAGNESIUM 40 MG/1
1 CAPSULE CAPSULE, DELAYED RELEASE ORAL ONCE A DAY
Qty: 90 | Refills: 3
Start: 2024-08-01

## 2025-02-03 RX ORDER — HYDROXYCHLOROQUINE SULFATE 200 MG/1
TABLET, FILM COATED ORAL
Qty: 90 TABLET | Status: ACTIVE | COMMUNITY

## 2025-02-03 RX ORDER — SUCRALFATE 1 G/1
TAKE 1 TABLET BY MOUTH TWICE A DAY ON EMPTY STOMACH TABLET ORAL
Qty: 180 EACH | Refills: 0 | Status: ACTIVE | COMMUNITY

## 2025-02-03 RX ORDER — GABAPENTIN 300 MG/1
3 CAPSULES ORALLY ONCE A DAY, AT BEDTIME 90 DAYS CAPSULE ORAL
Qty: 270 EACH | Refills: 0 | Status: ACTIVE | COMMUNITY

## 2025-02-03 RX ORDER — ANTIARTHRITIC COMBINATION NO.2 900 MG
1 TABLET TABLET ORAL ONCE A DAY
Status: ACTIVE | COMMUNITY

## 2025-02-03 RX ORDER — FLUTICASONE PROPIONATE 50 UG/1
1 SPRAY IN EACH NOSTRIL SPRAY, METERED NASAL ONCE A DAY
Status: ACTIVE | COMMUNITY

## 2025-02-03 RX ORDER — PAROXETINE 10 MG/1
TABLET, FILM COATED ORAL
Qty: 45 TABLET | Status: ACTIVE | COMMUNITY

## 2025-02-03 RX ORDER — LEVETIRACETAM 500 MG/1
TABLET, FILM COATED ORAL
Qty: 135 TABLET | Status: ACTIVE | COMMUNITY

## 2025-02-03 NOTE — HPI-CROHN'S DISEASE
Last CRP, fecal calprotectiin, CBC, and CMP done: 10/22/2024 Last colonoscoopy (q2y after 8ys of disease): 7/25/2024 Last endoscopy: 7/25/2024 Personal cancer history: no, but 3x Mohs surgerys for pre-cancerous skin lesions Family history of GI cancer: sororal colon rectal cancer, diagnosed at 55; paternal skin cancer; paternal aunt breast cancer Current IBD med: none   Prior IBD meds (failed): pentasa/mesalamine, azathioprine (for Crohn's disease or possible Lupus, given by Rheumatology) Last HepB and Quantiferon gold done: none.  Steroid use/response: prednisone  (for lupus)  Last CT/MR enterography (q2y): never had, but she is doing very well, will hold off for now IBD surgical history: none Cardiac history: none.  When diagnosed: [place] 2004

## 2025-02-03 NOTE — HPI-HOSPITAL FOLLOWUP
As per [facility] ED provider note dated 21 January 2024 (Wilman Marie MD):  "The patient is a 53 y.o. female who presents to the emergency department via Walk-in with a chief complaint of Abdominal Pain. 53-year-old female with past medical history significant for lupus, hyperlipidemia, possible Crohn's disease presenting with a chief complaint of abdominal pain. Ongoing for the last Monday almost a week. Mainly epigastric. Sometimes radiates into her back. She describes it as a burning. No obvious provoking relieving factors. Has been nauseous no vomiting. No chest pain. No black or bloody stools. No urinary symptoms. No fevers. Denies previous abdominal surgeries.  Management/Reassessment/MDM: 53-year-old female presenting with abdominal pain. Doubt atypical cardiac, labs largely noncontributory slight leukopenia. CT abdomen pelvis showing suggestion of gastritis which I think would be in keeping her presentation. She already takes omeprazole 40 twice daily. She has GI. Advise contact her GI have also provided an internal referral. I will add Carafate. Looks well. Given return precautions. Advised avoid irritants.Impression/Diagnosis(es): The primary encounter diagnosis was Gastritis, presence of bleeding unspecified, unspecified chronicity, unspecified gastritis type. A diagnosis of Abdominal pain, unspecified abdominal location was also pertinent to this visit.

## 2025-02-03 NOTE — HPI-PREVIOUS LABS
Labs dated 10/22/2024 CRP - Within normal limits . Fecal calprotectin - 134 . CMP - White blood cell count 2.6 -Absolute neutrophils, 1300 - Absolute lymphocytes 676 - Rest within normal limits . CMP, 7/29/2024 - Urea nitrogen 6 - Rest within normal limits . CBC, CMP, 1/21/2024 -WBC 2.5 -absolute neutrophils 1.3 -absolute lymphocytes 0.9 -CO2 31 -anion gap 3 -AST 37 -All remaining lab values of CBC, CMP, serum hCG, troponin and urinalysis are negative or within normal limits.

## 2025-02-03 NOTE — HPI-TODAY'S VISIT:
This is a very pleasant 53-year-old female who presetns to day with the chief complaint of "Crohn's F/U".  Past medical history significant for hyperlipidemia, SLE, epilepsy, asthma, IBS, anxiety, hiatal hernia, RLS, pyelonephritis, small bowel Crohn's disease and colon polyps.  Past surgical history significant for Mohs x3 and craniotomy. Family history is significant for sororal history of colon cancer, diagnosed at 55, paternal skin cancer, and paternal aunt with breast cancer. Last colonoscopy, 7/25/2024, Dr. Person, 3-year recall Last endoscopy, Dr. Person, 7/25/2024 Cardiologist: none  Patient was last seen in the office on 8/1/2024 for a postop follow-up.  At that visit, patient explained she was doing well and had no symptoms of her Crohn's at this time.  She endorsed a 1-2 times daily bowel movement.  She explains she is currently taking mycophenolate by her rheumatologist for her other autoimmune conditions which seems to be currently working well for her Crohn's disease as well.  At last visit, patient was taking esomeprazole 40 mg daily as well as sucralfate twice daily. . Patient presents today explaining she is doing well.  I explained that we are following up today in reference to her Crohn's disease.  She explains that she was told by a provider that she does not have Crohn's disease, and explains that she was told that it was her lupus causing inflammation in her GI tract.  I explained to the patient that whether it is Crohn's or SLE causing inflammation due to her being on mycophenolate, this could be keeping all of her rheumatologic diseases including possible small bowel Crohn's, under control.  I explained that this was seen in 2013 and a capsule endoscopy.  Additionally past chart notes document that patient's rheumatologist had done a [employer] Crohn's assay which came back positive.  I explained to the patient we can follow-up every 6 months for recheck, I explained she was given a 2-3-year recall on colonoscopy.  She continues to take esomeprazole 40 mg daily and sucralfate twice daily and endorses no GI complaints at this time. Patient explained that she recalled us wanting to test her for celiac disease, I put through celiac disease comprehensive panel  I refilled her esomeprazole. . Patient denies abdominal pain, belching, bloating, constipation, diarrhea, dysphagia, pyrosis, melena, hematochezia, hematemesis, nausea, vomiting, BRBPR, and unintentional weight loss.

## 2025-02-03 NOTE — HPI-PREVIOUS IMAGING
CT abdomen and pelvis with contrast/21 January 2024. Gastric wall edema appears similar to prior study: recurrent gastritis is likely. ********** RUQ ultrasound/21 January 2024. Unremarkable right upper quadrant ultrasound. Upper GI series, 4/26/2024 - Thickening of the gastric rugae which can correlate with gastritis - Normal appearance of the esophagus. No reflux was appreciated.

## 2025-02-03 NOTE — HPI-PREVIOUS PROCEDURES
EGD, 7/25/2024, Dr. Person - Normal esophagus.  Biopsied. - 2 cm hiatal hernia. - Gastritis.  Biopsied. - Multiple gastric polyps.  Biopsied. - Normal duodenal bulb and second portion of the duodenum.  Biopsied. . Colonoscopy, 7/25/2024, Dr. Person - Internal hemorrhoids. - Biopsies were obtained in the rectum, in the sigmoid colon, in the descending colon, and in the transverse colon and in the ascending colon. - Repeat colonoscopy in 2 years for surveillance based on pathology results. . EGD and colonoscopy pathology report, 7/25/2024 - Duodenal biopsy; no significant abnormality - Stomach, antrum biopsy; mild chemical/reactive gastropathy.  No evidence of H. pylori organisms or intestinal metaplasia.  Negative for dysplasia or malignancy. - Stomach, fundus biopsy; fundic gland polyp.  No evidence of H. pylori organisms or intestinal metaplasia.  Negative for dysplasia or malignancy - Esophagus, lower third biopsy; squamous mucosa with reflux type changes, no columnar mucosa identified.  No evidence of Lemus's esophagus or eosinophilic esophagitis.  Negative for infectious organisms, dysplasia or malignancy. - Ascending colon biopsy; no significant abnormality - Transverse colon biopsy; no significant abnormality - Descending colon biopsy; no significant abnormality - Sigmoid colon biopsy; no significant abnormality - Rectum biopsy; no significant abnormality . Colonoscopy, 7/22/2021.  -Unremarkable terminal ileum.  -11 mm advanced tubular adenomatous polyp removed from cecum.  -Two advanced tubular adenomatous polyps (10-14 mm) were removed from the proximal ascending colon.  -Two tubular adenomatous polyps, one advanced in size (8-10 mm) were removed from the mid transverse colon. -Internal hemorrhoids present.  -Biopsies of the ascending colon and rectum demonstrate colonic mucosa without significant histopathologic diagnosis.  -All remaining findings are negative or benign.  -Recommend repeat colonoscopy in 3 years due to advanced tubular adenomatous polyps removed on this procedure and a personal history of Crohn's. . EGD-colonoscopy, 4/27/2021.  -2 cm hiatal hernia visible in the esophagus.  -Moderately severe gastritis found in the stomach.  -A few fundal foveal polyps (measuring less than 5 mm in size) were found in the fundus.  -Unremarkable duodenum.  -Unremarkable terminal ileum.  -14 mm advanced tubular adenomatous polyp removed from the ascending colon.  -Evidence of mild proctitis.  -Internal hemorrhoids present.  -Pathology demonstrates ileal type mucosa with lymphoid hyperplasia, negative for significant acute inflammation in the terminal ileum biopsy and large bowel mucosa with focal mild nonspecific chronic inflammation and scattered benign lymphoid aggregates.  -All remaining findings are negative or benign.  -Recommend repeat colonoscopy in 3 years due to advanced tubular adenomatous polyp removed on this procedure. . Capsule endoscopy, 6/12/2013 - Indication: Abdominal pain, diarrhea, weight loss - Discrete findings of erythema and edema consistent with small bowel Crohn's disease, mild in extent and severity.  Findings consistent with mild Crohn's of small bowel. . EGD and colonoscopy, 4/27/2011 --2 cm hiatal hernia visible in the esophagus. --Moderately severe gastritis found in the stomach. --A few fundal foveal polyps (measuring less than 5 mm in size) were found in the fundus. --Unremarkable duodenum. --Unremarkable terminal ileum. --14 mm advanced tubular adenomatous polyp removed from the ascending colon. --Evidence of mild proctitis. Internal hemorrhoids present. --Pathology demonstrates ileal type mucosa with lymphoid hyperplasia, negative for significant acute inflammation in the terminal ileum biopsy and large bowel mucosa with focal mild nonspecific chronic inflammation and scattered benign lymphoid aggregates. --All remaining findings are negative or benign. --Recommend repeat colonoscopy in 3 years due to advanced tubular adenomatous polyp removed on this procedure.

## 2025-02-08 PROBLEM — 56689002: Status: ACTIVE | Noted: 2025-02-08

## 2025-03-28 ENCOUNTER — TELEPHONE ENCOUNTER (OUTPATIENT)
Dept: URBAN - METROPOLITAN AREA CLINIC 63 | Facility: CLINIC | Age: 55
End: 2025-03-28

## 2025-04-10 ENCOUNTER — TELEPHONE ENCOUNTER (OUTPATIENT)
Dept: URBAN - METROPOLITAN AREA CLINIC 63 | Facility: CLINIC | Age: 55
End: 2025-04-10

## 2025-04-10 RX ORDER — ESOMEPRAZOLE MAGNESIUM 40 MG/1
1 CAPSULE CAPSULE, DELAYED RELEASE ORAL ONCE A DAY
Qty: 90 CAPSULE | Refills: 3
Start: 2024-08-01

## 2025-06-21 ENCOUNTER — WEB ENCOUNTER (OUTPATIENT)
Dept: URBAN - METROPOLITAN AREA CLINIC 63 | Facility: CLINIC | Age: 55
End: 2025-06-21

## 2025-08-04 ENCOUNTER — ERX REFILL RESPONSE (OUTPATIENT)
Dept: URBAN - METROPOLITAN AREA CLINIC 63 | Facility: CLINIC | Age: 55
End: 2025-08-04

## 2025-08-04 RX ORDER — SUCRALFATE 1 G/1
TAKE 1 TABLET ON AN EMPTY STOMACH ORAL TWICE A DAY 90 DAYS TABLET ORAL
Qty: 180 TABLET | Refills: 0

## 2025-08-07 ENCOUNTER — OFFICE VISIT (OUTPATIENT)
Dept: URBAN - METROPOLITAN AREA CLINIC 60 | Facility: CLINIC | Age: 55
End: 2025-08-07

## 2025-08-14 ENCOUNTER — OFFICE VISIT (OUTPATIENT)
Dept: URBAN - METROPOLITAN AREA CLINIC 60 | Facility: CLINIC | Age: 55
End: 2025-08-14